# Patient Record
Sex: FEMALE | Race: WHITE | Employment: FULL TIME | URBAN - METROPOLITAN AREA
[De-identification: names, ages, dates, MRNs, and addresses within clinical notes are randomized per-mention and may not be internally consistent; named-entity substitution may affect disease eponyms.]

---

## 2018-11-23 ENCOUNTER — APPOINTMENT (OUTPATIENT)
Dept: GENERAL RADIOLOGY | Age: 43
End: 2018-11-23
Payer: COMMERCIAL

## 2018-11-23 ENCOUNTER — HOSPITAL ENCOUNTER (EMERGENCY)
Age: 43
Discharge: HOME | End: 2018-11-23
Attending: EMERGENCY MEDICINE
Payer: COMMERCIAL

## 2018-11-23 VITALS
RESPIRATION RATE: 12 BRPM | DIASTOLIC BLOOD PRESSURE: 85 MMHG | OXYGEN SATURATION: 99 % | HEIGHT: 62 IN | SYSTOLIC BLOOD PRESSURE: 119 MMHG | HEART RATE: 68 BPM | WEIGHT: 169.75 LBS | BODY MASS INDEX: 31.24 KG/M2 | TEMPERATURE: 98.5 F
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
## 2018-11-23 DIAGNOSIS — M54.2 NECK PAIN: Primary | ICD-10-CM

## 2018-11-23 DIAGNOSIS — M25.571 ACUTE RIGHT ANKLE PAIN: ICD-10-CM

## 2018-11-23 DIAGNOSIS — M54.50 ACUTE BILATERAL LOW BACK PAIN WITHOUT SCIATICA: ICD-10-CM

## 2018-11-23 DIAGNOSIS — M25.561 ACUTE PAIN OF RIGHT KNEE: ICD-10-CM

## 2018-11-23 PROCEDURE — 73562 X-RAY EXAM OF KNEE 3: CPT

## 2018-11-23 PROCEDURE — 73610 X-RAY EXAM OF ANKLE: CPT

## 2018-11-23 PROCEDURE — 72100 X-RAY EXAM L-S SPINE 2/3 VWS: CPT

## 2018-11-23 PROCEDURE — 99282 EMERGENCY DEPT VISIT SF MDM: CPT

## 2018-11-23 PROCEDURE — 72050 X-RAY EXAM NECK SPINE 4/5VWS: CPT

## 2018-11-23 RX ORDER — MELOXICAM 15 MG/1
15 TABLET ORAL DAILY
Qty: 10 TAB | Refills: 0 | Status: SHIPPED | OUTPATIENT
Start: 2018-11-23 | End: 2018-12-03

## 2018-11-23 RX ORDER — METHOCARBAMOL 750 MG/1
750 TABLET, FILM COATED ORAL 3 TIMES DAILY
Qty: 15 TAB | Refills: 0 | Status: SHIPPED | OUTPATIENT
Start: 2018-11-23 | End: 2018-11-28

## 2018-11-23 RX ORDER — LISINOPRIL 20 MG/1
20 TABLET ORAL DAILY
COMMUNITY
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
## 2018-11-23 NOTE — ED NOTES
ALAN Villar reviewed discharge instructions with the patient. The patient verbalized understanding. All questions and concerns were addressed. The patient declined a wheelchair and is discharged ambulatory in the care of family members with instructions and prescriptions in hand. Pt is alert and oriented x 4. Respirations are clear and unlabored.
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
## 2019-02-08 ENCOUNTER — HOSPITAL ENCOUNTER (OUTPATIENT)
Dept: MRI IMAGING | Age: 44
Discharge: HOME | End: 2019-02-08
Attending: PODIATRIST
Payer: COMMERCIAL

## 2019-02-08 VITALS
RESPIRATION RATE: 16 BRPM | DIASTOLIC BLOOD PRESSURE: 93 MMHG | SYSTOLIC BLOOD PRESSURE: 147 MMHG | HEART RATE: 76 BPM | OXYGEN SATURATION: 98 %

## 2019-02-08 DIAGNOSIS — M79.671 PAIN IN RIGHT FOOT: ICD-10-CM

## 2019-02-08 DIAGNOSIS — S90.31XA CONTUSION OF RIGHT FOOT, INITIAL ENCOUNTER: ICD-10-CM

## 2019-02-08 PROCEDURE — A9575 INJ GADOTERATE MEGLUMI 0.1ML: HCPCS | Performed by: PODIATRIST

## 2019-02-08 PROCEDURE — 74011636320 HC RX REV CODE- 636/320: Performed by: PODIATRIST

## 2019-02-08 PROCEDURE — 73720 MRI LWR EXTREMITY W/O&W/DYE: CPT

## 2019-02-08 PROCEDURE — 74011250637 HC RX REV CODE- 250/637: Performed by: RADIOLOGY

## 2019-02-08 RX ORDER — DIPHENHYDRAMINE HCL 25 MG
25 CAPSULE ORAL ONCE
Status: DISPENSED | OUTPATIENT
Start: 2019-02-08 | End: 2019-02-09

## 2019-02-08 RX ORDER — DIPHENHYDRAMINE HCL 25 MG
25 CAPSULE ORAL ONCE
Status: COMPLETED | OUTPATIENT
Start: 2019-02-08 | End: 2019-02-08

## 2019-02-08 RX ADMIN — DIPHENHYDRAMINE HYDROCHLORIDE 25 MG: 25 CAPSULE ORAL at 17:36

## 2019-02-08 RX ADMIN — GADOTERATE MEGLUMINE 15 ML: 376.9 INJECTION INTRAVENOUS at 18:00

## 2019-02-08 NOTE — PROGRESS NOTES
Pt C/O itching post contrast administration. No visible hives. No SOB or throat swelling. VSS. Dr. Dion Savage at bedside at states to give PO Benedryl and to check in 30 min. Pt has  in waiting room who will take pt home. Will monitor.

## 2020-10-01 ENCOUNTER — APPOINTMENT (OUTPATIENT)
Dept: GENERAL RADIOLOGY | Age: 45
End: 2020-10-01
Attending: EMERGENCY MEDICINE
Payer: COMMERCIAL

## 2020-10-01 ENCOUNTER — HOSPITAL ENCOUNTER (EMERGENCY)
Age: 45
Discharge: HOME | End: 2020-10-01
Attending: EMERGENCY MEDICINE | Admitting: EMERGENCY MEDICINE
Payer: COMMERCIAL

## 2020-10-01 VITALS
SYSTOLIC BLOOD PRESSURE: 155 MMHG | DIASTOLIC BLOOD PRESSURE: 94 MMHG | BODY MASS INDEX: 32.85 KG/M2 | HEIGHT: 63 IN | OXYGEN SATURATION: 95 % | WEIGHT: 185.41 LBS | RESPIRATION RATE: 20 BRPM | HEART RATE: 66 BPM | TEMPERATURE: 99.1 F

## 2020-10-01 DIAGNOSIS — S61.216A LACERATION OF RIGHT LITTLE FINGER WITHOUT FOREIGN BODY WITHOUT DAMAGE TO NAIL, INITIAL ENCOUNTER: Primary | ICD-10-CM

## 2020-10-01 PROCEDURE — 75810000293 HC SIMP/SUPERF WND  RPR

## 2020-10-01 PROCEDURE — 90715 TDAP VACCINE 7 YRS/> IM: CPT | Performed by: PHYSICIAN ASSISTANT

## 2020-10-01 PROCEDURE — 90471 IMMUNIZATION ADMIN: CPT

## 2020-10-01 PROCEDURE — 73130 X-RAY EXAM OF HAND: CPT

## 2020-10-01 PROCEDURE — 74011250636 HC RX REV CODE- 250/636: Performed by: PHYSICIAN ASSISTANT

## 2020-10-01 PROCEDURE — 99282 EMERGENCY DEPT VISIT SF MDM: CPT

## 2020-10-01 RX ORDER — BUPIVACAINE HYDROCHLORIDE 5 MG/ML
5 INJECTION, SOLUTION EPIDURAL; INTRACAUDAL
Status: DISCONTINUED | OUTPATIENT
Start: 2020-10-01 | End: 2020-10-01 | Stop reason: HOSPADM

## 2020-10-01 RX ORDER — LIDOCAINE HYDROCHLORIDE 10 MG/ML
5 INJECTION, SOLUTION EPIDURAL; INFILTRATION; INTRACAUDAL; PERINEURAL ONCE
Status: DISCONTINUED | OUTPATIENT
Start: 2020-10-01 | End: 2020-10-01 | Stop reason: HOSPADM

## 2020-10-01 RX ADMIN — TETANUS TOXOID, REDUCED DIPHTHERIA TOXOID AND ACELLULAR PERTUSSIS VACCINE, ADSORBED 0.5 ML: 5; 2.5; 8; 8; 2.5 SUSPENSION INTRAMUSCULAR at 13:33

## 2020-10-01 NOTE — ED NOTES
Patient and  given discharge verbal and written instructions, voicing no questions at this time. Discharged ambulatory with .

## 2020-10-01 NOTE — DISCHARGE INSTRUCTIONS
Patient Education        Cuts on the Hand Closed With Stitches: Care Instructions  Your Care Instructions     A cut on your hand can be on your fingers, your thumb, or the front or back of your hand. Sometimes a cut can injure the tendons, blood vessels, or nerves of your hand. The doctor used stitches to close the cut. Using stitches also helps the cut heal and reduces scarring. The doctor may have given you a splint to help prevent you from moving your hand, fingers, or thumb. If the cut went deep and through the skin, the doctor put in two layers of stitches. The deeper layer brings the deep part of the cut together. These stitches will dissolve and don't need to be removed. The stitches in the upper layer are the ones you see on the cut. You will probably have a bandage. You will need to have the stitches removed, usually in 7 to 14 days. The doctor may suggest that you see a hand specialist if the cut is very deep or if you have trouble moving your fingers or have less feeling in your hand. The doctor has checked you carefully, but problems can develop later. If you notice any problems or new symptoms, get medical treatment right away. Follow-up care is a key part of your treatment and safety. Be sure to make and go to all appointments, and call your doctor if you are having problems. It's also a good idea to know your test results and keep a list of the medicines you take. How can you care for yourself at home? · Keep the cut dry for the first 24 to 48 hours. After this, you can shower if your doctor okays it. Pat the cut dry. · Don't soak the cut, such as in a bathtub. Your doctor will tell you when it's safe to get the cut wet. · If your doctor told you how to care for your cut, follow your doctor's instructions. If you did not get instructions, follow this general advice:  ? After the first 24 to 48 hours, wash around the cut with clean water 2 times a day.  Don't use hydrogen peroxide or alcohol, which can slow healing. ? You may cover the cut with a thin layer of petroleum jelly, such as Vaseline, and a nonstick bandage. ? Apply more petroleum jelly and replace the bandage as needed. · Prop up the sore hand on a pillow anytime you sit or lie down during the next 3 days. Try to keep it above the level of your heart. This will help reduce swelling. · Avoid any activity that could cause your cut to reopen. · Do not remove the stitches on your own. Your doctor will tell you when to come back to have the stitches removed. · Be safe with medicines. Take pain medicines exactly as directed. ? If the doctor gave you a prescription medicine for pain, take it as prescribed. ? If you are not taking a prescription pain medicine, ask your doctor if you can take an over-the-counter medicine. When should you call for help? Call your doctor now or seek immediate medical care if:    · You have new pain, or your pain gets worse.     · The skin near the cut is cold or pale or changes color.     · You have tingling, weakness, or numbness near the cut.     · The cut starts to bleed, and blood soaks through the bandage. Oozing small amounts of blood is normal.     · You have trouble moving the area of the hand near the cut.     · You have symptoms of infection, such as:  ? Increased pain, swelling, warmth, or redness around the cut.  ? Red streaks leading from the cut.  ? Pus draining from the cut.  ? A fever. Watch closely for changes in your health, and be sure to contact your doctor if:    · You do not get better as expected. Where can you learn more? Go to http://www.gray.com/  Enter T250 in the search box to learn more about \"Cuts on the Hand Closed With Stitches: Care Instructions. \"  Current as of: June 26, 2019               Content Version: 12.6  © 5048-9676 [email or web address].    Care instructions adapted under license by [employer] (which disclaims liability or warranty for this information). If you have questions about a medical condition or this instruction, always ask your healthcare professional. [place] [address] any warranty or liability for your use of this information.

## 2020-10-01 NOTE — ED PROVIDER NOTES
EMERGENCY DEPARTMENT HISTORY AND PHYSICAL EXAM      Date: 10/1/2020  Patient Name: Bryant Potter    History of Presenting Illness     Chief Complaint   Patient presents with    Laceration     to right lateral 5th finger. pt was doing dishes and cut it on a broken glass     History Provided By: Patient    HPI: Bryant Potter, right-hand-dominant 38 y.o. female with medical history significant for depression and hypertension as well as former tobacco abuse who presents via private vehicle to the ED with cc of acute moderate right fifth finger laceration sustained 2 hours prior to arrival while cleaning dishes and accidentally cutting her finger on broken glass. Bleeding controlled with bandage/tile. No other medications or modifying factors. No weakness, numbness, tingling, no range of motion, foreign body sensation. No other injuries. Tetanus status unknown. PCP: Ana Maria Bustillo MD    There are no other complaints, changes, or physical findings at this time. No current facility-administered medications on file prior to encounter. Current Outpatient Medications on File Prior to Encounter   Medication Sig Dispense Refill    lisinopril (PRINIVIL, ZESTRIL) 20 mg tablet Take 20 mg by mouth daily.  paroxetine HCl (PAXIL PO) Take  by mouth. Past History     Past Medical History:  Past Medical History:   Diagnosis Date    Hypertension     Psychiatric disorder     depression     Past Surgical History:  Past Surgical History:   Procedure Laterality Date    HX GYN      tubal ligation     Family History:  No family history on file. Social History:  Social History     Tobacco Use    Smoking status: Former Smoker    Smokeless tobacco: Never Used   Substance Use Topics    Alcohol use: No    Drug use: No     Allergies:   Allergies   Allergen Reactions    Dotarem [Gadoterate Meglumine] Hives    Morphine Other (comments)     Review of Systems   Review of Systems   Constitutional: Negative for activity change, chills, fatigue and fever. HENT: Negative for congestion, ear pain, facial swelling, hearing loss, postnasal drip, rhinorrhea and trouble swallowing. Eyes: Negative. Negative for pain and visual disturbance. Respiratory: Negative for cough and shortness of breath. Cardiovascular: Negative for chest pain. Gastrointestinal: Negative for abdominal pain, nausea and vomiting. Musculoskeletal: Positive for arthralgias. Negative for neck pain. Skin: Positive for wound. Neurological: Negative for dizziness, seizures, weakness, light-headedness, numbness and headaches. Psychiatric/Behavioral: Negative. Physical Exam   Physical Exam  Vitals signs and nursing note reviewed. Constitutional:       General: She is not in acute distress. Appearance: She is well-developed. She is not diaphoretic. HENT:      Head: Normocephalic and atraumatic. Right Ear: Hearing and external ear normal.      Left Ear: Hearing and external ear normal.      Nose: Nose normal.   Eyes:      Conjunctiva/sclera: Conjunctivae normal.      Pupils: Pupils are equal, round, and reactive to light. Neck:      Musculoskeletal: Normal range of motion. Cardiovascular:      Pulses:           Radial pulses are 2+ on the right side and 2+ on the left side. Pulmonary:      Effort: Pulmonary effort is normal. No respiratory distress. Musculoskeletal: Normal range of motion. Right wrist: Normal.      Right hand: She exhibits tenderness and laceration. She exhibits normal range of motion, no bony tenderness, normal two-point discrimination, normal capillary refill, no deformity and no swelling. Normal sensation noted. Normal strength noted. Left hand: Normal.        Hands:    Skin:     General: Skin is warm and dry. Findings: Laceration present. Neurological:      Mental Status: She is alert and oriented to person, place, and time.    Psychiatric:         Behavior: Behavior normal. Thought Content: Thought content normal.         Judgment: Judgment normal.       Diagnostic Study Results   Labs -   No results found for this or any previous visit (from the past 12 hour(s)). Radiologic Studies -   XR HAND RT MIN 3 V   Final Result   IMPRESSION: No fracture or radiopaque foreign body. Xr Hand Rt Min 3 V    Result Date: 10/1/2020  IMPRESSION: No fracture or radiopaque foreign body. Medical Decision Making   I am the first provider for this patient. I reviewed the vital signs, available nursing notes, past medical history, past surgical history, family history and social history. Vital Signs-Reviewed the patient's vital signs. Patient Vitals for the past 24 hrs:   Temp Pulse Resp BP SpO2   10/01/20 1143 99.1 °F (37.3 °C) 66 20 (!) 155/94 95 %     Pulse Oximetry Analysis - 95% on RA and normal    Records Reviewed: Nursing Notes, Old Medical Records, Previous Radiology Studies and Previous Laboratory Studies    Provider Notes (Medical Decision Making):   Patient presents with laceration. DDx: simple laceration vs complex laceration (open fracture, foreign body retention). The wound has been explored well without foreign bodies noted and closed appropriately under sterile technique. Laboratory tests, medications, x-rays, diagnosis, follow up plan and return instructions have been reviewed and discussed with the patient. The patienthas had the opportunity to ask questions about their care. The patient expresses understanding and agreement with diagnosis, follow up and return instructions. The patient agrees to return for suture removal in the discussed time period and expresses understanding that leaving sutures in place for longer periods will lead to scarring and infection. The patient expresses understanding that although appropriate care was taken in wound management that scarring and infection can still occur. Tetanus has been updated if necessary.           ED Course: Initial assessment performed. The patients presenting problems have been discussed, and they are in agreement with the care plan formulated and outlined with them. I have encouraged them to ask questions as they arise throughout their visit. ED Course as of Oct 01 1254   Thu Oct 01, 2020   1253 Procedure Note - Laceration Repair:  12:53 PM  Procedure by LAAN Guerra. Complexity: Complex  2cm v-shaped laceration to Rt 5th finger  was irrigated copiously with NS under jet lavage, prepped with Betadine and draped in a sterile fashion. The area was anesthetized via digital block using 3 mL lidocaine 1% without epinephrine and bupivacaine 0.50% without epinephrine. The wound was explored with the following results: No foreign bodies found, No tendon laceration seen. The wound was repaired with One layer suture closure: Skin Layer:  4 sutures placed, stitch type:simple interrupted, suture: 4-0 nylon. .  The wound was closed with good hemostasis and approximation. Sterile dressing applied. Estimated blood loss: 0  The procedure took 16-30 minutes, and pt tolerated well.    [SM]      ED Course User Index  [SM] Arian Hagan PA-C       Progress Note:   Updated pt on all returned results and findings. Discussed the importance of proper follow up as referred below along with return precautions. Pt in agreement with the care plan and expresses agreement with and understanding of all items discussed. Disposition:  12:54 PM  I have discussed with patient their diagnosis, treatment, and follow up plan. The patient agrees to follow up as outlined in discharge paperwork and also to return to the ED with any worsening. Akash Pelayo PA-C      PLAN:  1. Current Discharge Medication List        2.    Follow-up Information     Follow up With Specialties Details Why Contact Info    [facility] EMERGENCY DEPT Emergency Medicine Go in 1 week For suture removal [address] [place] [address]  [phone] Return to ED if worse     Diagnosis     Clinical Impression:   1. Laceration of right little finger without foreign body without damage to nail, initial encounter            Please note that this dictation was completed with Dragon, computer voice recognition software. Quite often unanticipated grammatical, syntax, homophones, and other interpretive errors are inadvertently transcribed by the computer software. Please disregard these errors. Additionally, please excuse any errors that have escaped final proofreading.

## 2020-10-27 ENCOUNTER — OFFICE VISIT (OUTPATIENT)
Dept: URGENT CARE | Age: 45
End: 2020-10-27
Payer: COMMERCIAL

## 2020-10-27 VITALS — RESPIRATION RATE: 18 BRPM | OXYGEN SATURATION: 98 % | TEMPERATURE: 98.5 F | HEART RATE: 76 BPM

## 2020-10-27 DIAGNOSIS — J02.9 SORE THROAT: ICD-10-CM

## 2020-10-27 DIAGNOSIS — R05.9 COUGH: Primary | ICD-10-CM

## 2020-10-27 PROCEDURE — 99202 OFFICE O/P NEW SF 15 MIN: CPT | Performed by: NURSE PRACTITIONER

## 2020-10-27 NOTE — PROGRESS NOTES
Subjective: (As above and below)       This patient was seen in Flu Clinic at [address] [place] [address] Urgent Care while outdoors at their vehicle due to COVID-19 pandemic with PPE and focused examination in order to decrease community viral transmission. The patient/guardian gave verbal consent to treat. Chief Complaint   Patient presents with    Covid Testing     job requirement     Francine Agrawal is a 38 y.o. female who presents for evaluation of : cough and sore throat . Symptom onset 4-5 days ago Preceding illness: none. Has tried otc therapies. No other identified aggravating or alleviating factors. Symptoms are constant and overall improving. Promotes no decrease in PO intake of fluids. Denies: severe lethargy, SOB, syncope/near syncope, vomiting/diarrhea, chest pain, chest pain with breathing, labored breathing, severe headache, non-intractable fevers . States needs test for work. Recent travel: no  Known Exposure to COVID-19: no  Known flu or strep contact: no           Review of Systems - negative except as listed above    Reviewed PmHx, RxHx, FmHx, SocHx, AllgHx and updated in chart. History reviewed. No pertinent family history. Past Medical History:   Diagnosis Date    Hypertension     Psychiatric disorder     depression      Social History     Socioeconomic History    Marital status:      Spouse name: Not on file    Number of children: Not on file    Years of education: Not on file    Highest education level: Not on file   Tobacco Use    Smoking status: Former Smoker    Smokeless tobacco: Never Used   Substance and Sexual Activity    Alcohol use: No    Drug use: No    Sexual activity: Yes     Birth control/protection: Pill          Current Outpatient Medications   Medication Sig    lisinopril (PRINIVIL, ZESTRIL) 20 mg tablet Take 20 mg by mouth daily.  paroxetine HCl (PAXIL PO) Take  by mouth. No current facility-administered medications for this visit. Objective:     Vitals:    10/27/20 1600   Pulse: 76   Resp: 18   Temp: 98.5 °F (36.9 °C)   SpO2: 98%       Physical Exam  General appearance  appears well hydrated and does not appear toxic, no acute distress  Eyes - EOMs intact. Non injected. No scleral icterus   Ears - no external swelling  Nose - . No purulent drainage  Mouth - OP mild erythema without swelling, exudate or lesion. Mucus membranes moist. Uvula midline. Neck/Lymphatics  trachea midline, full AROM  Chest - normal breathing effort no wheeze rales or rhonchi bilat  Heart - RRR no murmurs  Skin - no observable rashes or pallor  Neurologic- alert and oriented x 3  Psychiatric- normal mood, behavior and though content. Assessment/ Plan:     1. Cough  Plan:  Differential diagnosis includes: viral URI, COVID-19, bronchitis, sinusitis, seasonal allergies  No evidence of complication of illness at this time. Supportive home care- maintain adequate fluid intake, lozenges, over the counter Tylenol. Patient is aware that the differential includes COVID-19 and was advised the following: self isolation based on current CDC guidelines, avoiding high risk individuals, washing hands frequently, avoid touching face, eyes or mucus membranes, wearing surgical mask if they are in public to reduce transmission to others.  - NOVEL CORONAVIRUS (COVID-19)    2. Sore throat    - NOVEL CORONAVIRUS (COVID-19)      Follow up: We have reviewed, in detail, particular presentations/worsening/concerning signs and symptoms that may warrant seeking immediate care in the ED setting and patient verbalized being aware of what to watch for. For other non-severe changes, non-improvement or questions, patient aware to contact PCP office or consider a virtual online medical consultation. Reviewed with her that COVID-19 pandemic is an evolving situation with rapidly changing recommendations & guidelines.   Medical decisions are made based on the the best information available at the time.   Recommended she stay tuned for updates published by trusted sources and to advise your PCP of any unexpected changes in clinical condition     Alejandra Arroyo NP

## 2020-10-27 NOTE — LETTER
NOTIFICATION RETURN TO WORK / SCHOOL 
 
10/27/2020 4:20 PM 
 
Ms. Jonn Perry [address] [place] [address] To Whom It May Concern: 
 
Jonn Perry is currently under the care of [address]. Please allow to quarantine until: 11/02/2020 If there are questions or concerns please have the patient contact our office. Sincerely, GHE PROVIDER

## 2020-10-29 LAB — SARS-COV-2, NAA: NOT DETECTED

## 2021-10-19 ENCOUNTER — TRANSCRIBE ORDER (OUTPATIENT)
Dept: SCHEDULING | Age: 46
End: 2021-10-19

## 2021-10-19 DIAGNOSIS — Z00.00 ANNUAL PHYSICAL EXAM: Primary | ICD-10-CM

## 2022-01-11 ENCOUNTER — HOSPITAL ENCOUNTER (OUTPATIENT)
Dept: GENERAL RADIOLOGY | Age: 47
Discharge: HOME | End: 2022-01-11

## 2022-01-11 ENCOUNTER — TRANSCRIBE ORDER (OUTPATIENT)
Dept: REGISTRATION | Age: 47
End: 2022-01-11

## 2022-01-11 DIAGNOSIS — M54.2 CERVICALGIA: Primary | ICD-10-CM

## 2022-01-11 DIAGNOSIS — M54.2 CERVICALGIA: ICD-10-CM

## 2022-01-11 DIAGNOSIS — M54.9 DORSALGIA: ICD-10-CM

## 2022-01-11 PROCEDURE — 72040 X-RAY EXAM NECK SPINE 2-3 VW: CPT

## 2022-01-11 PROCEDURE — 72100 X-RAY EXAM L-S SPINE 2/3 VWS: CPT

## 2022-01-11 PROCEDURE — 72072 X-RAY EXAM THORAC SPINE 3VWS: CPT

## 2022-03-13 ENCOUNTER — HOSPITAL ENCOUNTER (EMERGENCY)
Age: 47
Discharge: HOME | End: 2022-03-13
Attending: EMERGENCY MEDICINE
Payer: COMMERCIAL

## 2022-03-13 ENCOUNTER — APPOINTMENT (OUTPATIENT)
Dept: GENERAL RADIOLOGY | Age: 47
End: 2022-03-13
Attending: EMERGENCY MEDICINE
Payer: COMMERCIAL

## 2022-03-13 VITALS
RESPIRATION RATE: 16 BRPM | WEIGHT: 192.46 LBS | SYSTOLIC BLOOD PRESSURE: 143 MMHG | HEIGHT: 62 IN | OXYGEN SATURATION: 98 % | HEART RATE: 77 BPM | TEMPERATURE: 98.3 F | DIASTOLIC BLOOD PRESSURE: 91 MMHG | BODY MASS INDEX: 35.42 KG/M2

## 2022-03-13 DIAGNOSIS — S93.492A SPRAIN OF ANTERIOR TALOFIBULAR LIGAMENT OF LEFT ANKLE, INITIAL ENCOUNTER: Primary | ICD-10-CM

## 2022-03-13 DIAGNOSIS — S90.02XA CONTUSION OF LEFT ANKLE, INITIAL ENCOUNTER: ICD-10-CM

## 2022-03-13 PROCEDURE — 74011250637 HC RX REV CODE- 250/637: Performed by: EMERGENCY MEDICINE

## 2022-03-13 PROCEDURE — 73630 X-RAY EXAM OF FOOT: CPT

## 2022-03-13 PROCEDURE — 73610 X-RAY EXAM OF ANKLE: CPT

## 2022-03-13 PROCEDURE — 99283 EMERGENCY DEPT VISIT LOW MDM: CPT

## 2022-03-13 RX ORDER — IBUPROFEN 600 MG/1
600 TABLET ORAL
Qty: 20 TABLET | Refills: 0 | Status: SHIPPED | OUTPATIENT
Start: 2022-03-13 | End: 2022-03-13

## 2022-03-13 RX ORDER — ACETAMINOPHEN 500 MG
1000 TABLET ORAL
Status: COMPLETED | OUTPATIENT
Start: 2022-03-13 | End: 2022-03-13

## 2022-03-13 RX ADMIN — ACETAMINOPHEN 1000 MG: 500 TABLET ORAL at 13:19

## 2022-03-13 NOTE — DISCHARGE INSTRUCTIONS
You were evaluated in the emergency department for ankle and foot pain after a fall. Your examination was reassuring as was your work-up including xrays. It will be important for you to follow-up with your primary care physician in 5-7 days if your symptoms do not improve. If you develop worsening symptoms such as recurrent falls, please return to the emergency department immediately.

## 2022-09-23 NOTE — LETTER
[address] 
[facility] EMERGENCY DEPT 
[address] [place] [address] [place] [id number] 
[phone] Work/School Note Date: 11/23/2018 To Whom It May concern: 
 
Jessica Chavez was seen and treated today in the emergency room. She may return to work in 1 to 2 days, as symptoms improve. Sincerely, Claudette Singh 
 
 
 
 No Contractions

## 2024-08-24 NOTE — ED PROVIDER NOTES
EMERGENCY DEPARTMENT HISTORY AND PHYSICAL EXAM      Date: 3/13/2022  Patient Name: Dwayne Pickett    History of Presenting Illness     Chief Complaint   Patient presents with    Leg Injury     pain lower left lewg ankle when she fell just pta ; fell down two stairs taking her dog out. no loc. no head injury. pt ambulates to ed room 5; she was wearing slippers when she fell       History Provided By: Patient    HPI: Dwayne Pickett, 51 y.o. female presents to the ED with cc of left ankle and foot pain. Symptoms onset today after patient sustained a fall. She tripped on an icy step while going out and fell down approximately 3 steps. She believes that her ankle got twisted underneath her. She noted bruising and welling to the anterior aspect of her ankle with pain radiating into the lateral aspect of her dorsal foot. She has been able to bear weight although with some difficulty. Denies any weakness or numbness. Denies any foot or ankle. Denies any pain to the proximal leg or knee. She has not taken anything prior to arrival.    There are no other complaints, changes, or physical findings at this time. PCP: Chen Mazariegos MD    No current facility-administered medications on file prior to encounter. Current Outpatient Medications on File Prior to Encounter   Medication Sig Dispense Refill    lisinopril (PRINIVIL, ZESTRIL) 20 mg tablet Take 20 mg by mouth daily.  paroxetine HCl (PAXIL PO) Take  by mouth. Past History     Past Medical History:  Past Medical History:   Diagnosis Date    Hypertension     Psychiatric disorder     depression       Past Surgical History:  Past Surgical History:   Procedure Laterality Date    HX GYN      tubal ligation       Family History:  No family history on file. Social History:  Social History     Tobacco Use    Smoking status: Former Smoker    Smokeless tobacco: Never Used   Substance Use Topics    Alcohol use: No    Drug use:  No Initial Clinical Review    Admission: Date/Time/Statement:   Admission Orders (From admission, onward)       Ordered        08/23/24 1435  Inpatient Admission  Once                          Orders Placed This Encounter   Procedures    Inpatient Admission     Standing Status:   Standing     Number of Occurrences:   1     Order Specific Question:   Level of Care     Answer:   Med Surg [16]     Order Specific Question:   Estimated length of stay     Answer:   More than 2 Midnights     Order Specific Question:   Certification     Answer:   I certify that inpatient services are medically necessary for this patient for a duration of greater than two midnights. See H&P and MD Progress Notes for additional information about the patient's course of treatment.       Initial Presentation: 39 y.o. female directly admitted from nephrology  office with abnormal lab results.   Patient states that a couple days ago she started feeling paresthesias in her right arm and face. States that she had gotten blood work done, and nephrology recommended an additional IV potassium dose. At that time he recommended that if symptoms were not improved to repeat labs Friday morning 8/23. Labs  the  morning of admission reveal  potassium   2.5, only symptom is paresthesias.  Feels  symptoms similar to prior  episodes of  hypokalemia.    Additional PMH  is  gastric bypass, iron def anemia, B12 def and chronic pancreatitis.  Admit  Ip  with  Hypokalemia and plan is monitor labs, replace electrolytes, nephrology consult, tele and continue home meds.    Nephrology consult  Resistant hypokalemia, likely  GI related.  Has been receiving KCL infusion but  potassium remains low.  Adding  additional  KCL and aldactone.   Monitor labs closely.      Anticipated Length of Stay/Certification Statement: Patient will be admitted on an inpatient basis with an anticipated length of stay of greater than 2 midnights secondary to electrolyte derangements,  Allergies: Allergies   Allergen Reactions    Dotarem [Gadoterate Meglumine] Hives    Morphine Other (comments)         Review of Systems   Review of Systems   Constitutional: Negative for fever. Musculoskeletal: Positive for arthralgias, gait problem and joint swelling. Skin: Positive for color change. Negative for wound. Neurological: Negative for weakness and numbness. All other systems reviewed and are negative. Physical Exam   Physical Exam  Vitals and nursing note reviewed. Constitutional:       General: She is not in acute distress. Appearance: Normal appearance. She is not ill-appearing or toxic-appearing. HENT:      Head: Normocephalic and atraumatic. Cardiovascular:      Rate and Rhythm: Normal rate and regular rhythm. Pulmonary:      Effort: Pulmonary effort is normal. No respiratory distress. Musculoskeletal:      Comments: Ambulatory but with antalgic gait. Left lower extremity without TTP to the proximal leg, knee, or proximal tib-fib. There is ecchymosis and mild soft tissue swelling noted to anterior ankle with reproducible pain at the dorsum of the left foot, also with pain to the dorsum fifth metatarsal.  2+ DP pulse. Neurovascularly intact distally. Brisk capillary refill. Skin:     General: Skin is warm and dry. Findings: No erythema or rash. Neurological:      General: No focal deficit present. Mental Status: She is alert and oriented to person, place, and time. Diagnostic Study Results     Labs -   No results found for this or any previous visit (from the past 12 hour(s)). Radiologic Studies -   XR FOOT LT MIN 3 V   Final Result   No acute abnormality. XR ANKLE LT MIN 3 V   Final Result   No acute abnormality. CT Results  (Last 48 hours)    None        CXR Results  (Last 48 hours)    None          Medical Decision Making   I am the first provider for this patient.     I reviewed the vital signs, available nursing notes, hypokalemia.     Date:   8/24   Day 2:   Still feels  numbness and tingling.  Urinating normally.   Has decreased appetite.  No other symptoms.  Most recent potassium  2.9  and continue with IV  replacement and aldactone.  Sodium today   133.  Continue current meds.    ED Triage Vitals   Temperature Pulse Respirations Blood Pressure SpO2 Pain Score   08/23/24 1254 08/23/24 1254 08/23/24 1254 08/23/24 1254 08/23/24 1254 08/23/24 1331   (!) 97.2 °F (36.2 °C) 98 18 134/78 98 % No Pain           Vital Signs (last 3 days)       Date/Time Temp Pulse Resp BP MAP (mmHg) SpO2 O2 Device Pain    08/24/24 07:19:02 97.3 °F (36.3 °C) 74 19 115/70 85 97 % -- --    08/23/24 19:36:24 97.5 °F (36.4 °C) 78 -- 117/70 86 97 % -- --    08/23/24 1925 -- -- -- -- -- -- None (Room air) No Pain    08/23/24 14:28:35 97.7 °F (36.5 °C) 75 18 120/72 88 100 % None (Room air) No Pain    08/23/24 1331 -- -- -- -- -- -- -- No Pain    08/23/24 12:54:25 97.2 °F (36.2 °C) 98 18 134/78 97 98 % -- --              Pertinent Labs/Diagnostic Test Results:   Radiology:      Results from last 7 days   Lab Units 08/23/24  1452   WBC Thousand/uL 7.91   HEMOGLOBIN g/dL 11.0*   HEMATOCRIT % 33.9*   PLATELETS Thousands/uL 339         Results from last 7 days   Lab Units 08/23/24  2303 08/23/24  0730 08/21/24  0751 08/19/24  0728   SODIUM mmol/L 133* 134* 135 135   POTASSIUM mmol/L 2.9* 2.5* 3.0* 2.9*   CHLORIDE mmol/L 100 96 98 100   CO2 mmol/L 25 27 21 23   ANION GAP mmol/L 8 11 16* 12   BUN mg/dL 22 20 22 20   CREATININE mg/dL 1.16 1.26 1.19 1.28   EGFR ml/min/1.73sq m 59 53 57 52   CALCIUM mg/dL 8.2* 9.0 8.9 9.0   MAGNESIUM mg/dL 2.3  --   --   --              Results from last 7 days   Lab Units 08/23/24  2303 08/21/24  0751 08/19/24  0728   GLUCOSE RANDOM mg/dL 95 76 86     Results from last 7 days   Lab Units 08/23/24  1534   OSMOLALITY, SERUM mmol/*               Results from last 7 days   Lab Units 08/23/24  1527   TSH 3RD GENERATON uIU/mL 2.342                          Results from last 7 days   Lab Units 08/23/24  1527   FERRITIN ng/mL 8*   IRON SATURATION % 5*   IRON ug/dL 20*   TIBC ug/dL 421           Results from last 7 days   Lab Units 08/23/24  1759 08/23/24  1534   OSMOLALITY, SERUM mmol/KG  --  279*   OSMO UR mmol/  --      Results from last 7 days   Lab Units 08/23/24  1759   SODIUM UR  <10   CREATININE UR mg/dL 56.1             Present on Admission:   Hypokalemia   Iron deficiency      Admitting Diagnosis: Hypokalemia [E87.6]  Age/Sex: 39 y.o. female  Admission Orders:  Scheduled Medications:  amitriptyline, 75 mg, Oral, HS  cyanocobalamin, 500 mcg, Oral, Daily  enoxaparin, 40 mg, Subcutaneous, Daily  [START ON 8/26/2024] ergocalciferol, 50,000 Units, Oral, Once per day on Monday Thursday  escitalopram, 20 mg, Oral, HS  iron sucrose, 200 mg, Intravenous, Once  potassium chloride, 20 mEq, Intravenous, Q2H  potassium chloride, 20 mEq, Intravenous, Q2H  spironolactone, 12.5 mg, Oral, Daily  vitamin A, 10,000 Units, Oral, BID      Continuous IV Infusions:     PRN Meds:  meclizine, 12.5 mg, Oral, TID PRN  ondansetron, 4 mg, Oral, Q6H PRN  SUMAtriptan, 6 mg, Subcutaneous, Once PRN        IP CONSULT TO NEPHROLOGY    Network Utilization Review Department  ATTENTION: Please call with any questions or concerns to [phone] and carefully listen to the prompts so that you are directed to the right person. All voicemails are confidential.   For Discharge needs, contact Care Management DC Support Team at [phone] opt. 2  Send all requests for admission clinical reviews, approved or denied determinations and any other requests to dedicated fax number below belonging to the campus where the patient is receiving treatment. List of dedicated fax numbers for the Facilities:  FACILITY NAME UR FAX NUMBER   ADMISSION DENIALS (Administrative/Medical Necessity) [phone]   DISCHARGE SUPPORT TEAM (NETWORK) [phone]   [facility]  past medical history, past surgical history, family history and social history. Vital Signs-Reviewed the patient's vital signs. Visit Vitals  BP (!) 143/91 (BP 1 Location: Left upper arm)   Pulse 77   Temp 98.3 °F (36.8 °C)   Resp 16   Ht 5' 2\" (1.575 m)   Wt 87.3 kg (192 lb 7.4 oz)   SpO2 98%   BMI 35.20 kg/m²       Records Reviewed: Nursing Notes    Provider Notes (Medical Decision Making):   79-year-old female presenting with left ankle and foot pain, swelling, ecchymosis, and contusion after fall down 3 steps today off an icy step. Afebrile and vital signs stable. No neurovascular compromise. Exam as above. Plain films negative for fracture or dislocation. Symptoms appear consistent with contusion and ankle sprain. Encourage use of Tylenol, ibuprofen as needed, Ace wrap, rest, ice, elevation, compression, and weightbearing as tolerated. Patient treated with Ace wrap and provided crutches in the ED. Encouraged follow-up with PCP and orthopedics as needed if no improvement in 1 week. Given strict return precautions and all questions answered. ED Course:   Initial assessment performed. The patients presenting problems have been discussed, and they are in agreement with the care plan formulated and outlined with them. I have encouraged them to ask questions as they arise throughout their visit. Discharge Note:  The patient has been re-evaluated and is ready for discharge. Reviewed available results with patient. Counseled patient on diagnosis and care plan. Patient has expressed understanding, and all questions have been answered. Patient agrees with plan and agrees to follow up as recommended, or to return to the ED if their symptoms worsen. Discharge instructions have been provided and explained to the patient, along with reasons to return to the ED. Disposition:  Discharge    DISCHARGE PLAN:  1.    Discharge Medication List as of 3/13/2022  2:11 PM      CONTINUE these medications (Maternity/NICU/Pediatrics) [phone]   [facility] [phone]   [facility] [phone]   [facility] [phone]   [facility] [phone]   [facility] [phone]   [facility] [phone]   [facility] [phone]   [facility] [phone]   [facility] [phone]   [facility] [phone]   [facility] [phone]   [facility] [phone]         which have NOT CHANGED    Details   lisinopril (PRINIVIL, ZESTRIL) 20 mg tablet Take 20 mg by mouth daily. , Historical Med      paroxetine HCl (PAXIL PO) Take  by mouth., Historical Med           2. Follow-up Information     Follow up With Specialties Details Why Contact Stephanie Metcalf MD Gastroenterology Schedule an appointment as soon as possible for a visit   [address] [facility]  [address] [phone]      [facility] EMERGENCY DEPT Emergency Medicine Go to  As needed, If symptoms worsen [address]  [address]  [phone]        3. Return to ED if worse     Diagnosis     Clinical Impression:   1. Sprain of anterior talofibular ligament of left ankle, initial encounter    2. Contusion of left ankle, initial encounter        Attestations:  I am the first and primary provider of record for this patient's ED encounter. I personally performed the services described above in this documentation. Alia Rios MD    Please note that this dictation was completed with [employer], the [employer] voice recognition software. Quite often unanticipated grammatical, syntax, homophones, and other interpretive errors are inadvertently transcribed by the computer software. Please disregard these errors. Please excuse any errors that have escaped final proofreading. Thank you.

## 2025-04-29 ENCOUNTER — TRANSCRIBE ORDERS (OUTPATIENT)
Facility: HOSPITAL | Age: 50
End: 2025-04-29

## 2025-04-29 DIAGNOSIS — Z12.31 ENCOUNTER FOR SCREENING MAMMOGRAM FOR MALIGNANT NEOPLASM OF BREAST: Primary | ICD-10-CM
